# Patient Record
Sex: MALE | Race: WHITE
[De-identification: names, ages, dates, MRNs, and addresses within clinical notes are randomized per-mention and may not be internally consistent; named-entity substitution may affect disease eponyms.]

---

## 2018-04-12 ENCOUNTER — HOSPITAL ENCOUNTER (INPATIENT)
Dept: HOSPITAL 80 - FED | Age: 43
LOS: 4 days | Discharge: HOME | DRG: 881 | End: 2018-04-16
Attending: SPECIALIST | Admitting: PSYCHIATRY & NEUROLOGY
Payer: COMMERCIAL

## 2018-04-12 DIAGNOSIS — R45.851: ICD-10-CM

## 2018-04-12 DIAGNOSIS — F32.9: Primary | ICD-10-CM

## 2018-04-12 DIAGNOSIS — F41.0: ICD-10-CM

## 2018-04-12 DIAGNOSIS — R94.6: ICD-10-CM

## 2018-04-12 DIAGNOSIS — R51: ICD-10-CM

## 2018-04-12 DIAGNOSIS — F42.9: ICD-10-CM

## 2018-04-12 DIAGNOSIS — Z63.79: ICD-10-CM

## 2018-04-12 DIAGNOSIS — Z56.6: ICD-10-CM

## 2018-04-12 LAB — PLATELET # BLD: 188 10^3/UL (ref 150–400)

## 2018-04-12 PROCEDURE — G0480 DRUG TEST DEF 1-7 CLASSES: HCPCS

## 2018-04-12 SDOH — HEALTH STABILITY - MENTAL HEALTH: OTHER PHYSICAL AND MENTAL STRAIN RELATED TO WORK: Z56.6

## 2018-04-12 NOTE — EDPHY
HPI/HX/ROS/PE/MDM


Narrative: 





CHIEF COMPLAINT:  Depression, suicidal thoughts





HISTORY OF PRESENT ILLNESS:   The patient is a 43 y/o male arriving voluntarily 

with his wife from his PCP's office for evaluation of depression and suicidal 

thoughts accelerating over the last 2 weeks. He has minimal prior medical 

history, though does say, "I've had a headache since I was 18 and it's never 

gone away;" he had negative brain imaging at some point.  





He tells me for several months, "I've had this paranoia and guilt about the 

health of my kids" regarding belief they have dental fluorosis and says, "I 

somehow feel responsible for that even though 9 other people and professionals 

have told me that's not what it is." His PCP describes this as rumination and 

his wife reports this has escalated over the last 2 weeks into a "cycle" and at 

times seems like a panic attack and has become debilitating. He went to his PCP 

for evaluation 4/3/18 and was prescribed Lexapro, which he has been taking 

daily without noticeable improvement. He says he hasn't eaten much during this 

time and has lost 20lbs. He describes sleeping for most of the day and great 

difficulty getting out of bed. He says he has no interest in any activities and 

just lies on the floor. 





Today, "I snapped and started getting aggressive with myself. I hit my head on 

the wall for no real reason other than maybe a combination of guilt, worry, and 

frustration." His wife says this event seemed to be provoked by the patient 

being left alone at home while she took their child to an appointment. When at 

baseline, the patient prefers to be alone and this is very uncharacteristic for 

him. She says, "I'm nervous leaving him on his own." The patient endorses vague 

suicidal thoughts, but denies a plan nor attempts to carry anything out. No 

ingestions, hallucinations, or angry thoughts towards others. He has never felt 

like this previously. He notes his father is terminally ill and his job is 

stressful. No family history of bipolar disorder in 1st degree family members. 





No fever, chills, chest pain, shortness of breath, palpitations, vomiting, 

diarrhea, urinary complaints. 





REVIEW OF SYSTEMS:


Aside from elements discussed in the HPI, a comprehensive 10-point review of 

systems was reviewed and is negative.





PAST MEDICAL HISTORY:   Constant headache since age 18. Started Lexapro 10mg, 

0.5mg clonazepam on 4/3/18.





FAMILY HISTORY: Grandfather had severe panic attacks and anxiety in his 50s 

forcing him to retire early, then  6 years later of prostate cancer. 





SOCIAL HISTORY:  Wife at bedside. No cigarettes. Little alcohol use. No illicit 

drugs or marijuana. Employed as . PCP: [facility] [place] [facility]





VITAL SIGNS: Reviewed by me


GENERAL: Well-developed, well-nourished, resting comfortably in no respiratory 

distress. Pleasant cooperative.  


HEENT: Atraumatic. Eyes: No icterus, no injection. Mild right anisocoria, pt 

reports is baseline since eye injury as a child. Mouth: moist mucous membranes.

  No erythema or lesions. Neck: supple with no adenopathy.


LUNGS: Clear to auscultation bilaterally, no wheezes, rhonchi or rales.


CARDIAC: Regular rate and rhythm, no rubs, murmurs or gallops.


ABDOMEN: Soft, nontender, nondistended, bowel sounds normal.


BACK:  No CVA tenderness.


EXTREMITIES: No trauma. No edema.  Range of motion is normal throughout.


NEURO: Alert and oriented,  grossly nonfocal.  


SKIN: Warm and dry, no rash.


PSYCHIATRIC: Normal mentation, no agitation.





Portions of this note were transcribed by a medical scribe.  I personally 

performed a history, physical exam, medical decision making, and confirmed 

accuracy of information the transcribed note.


ED Course: 





This is a normally healthy 43 y/o male who presents with several months of 

paranoia, depressive symptoms, and suicidal ideation that have accelerated over 

the last 2 weeks. He has mild right anisocoria, but an otherwise unremarkable 

exam. Plan for standard psychiatric work up including IV, labs, UA, and mental 

health evaluation once medically cleared. 





Head CT ordered as patient's last brain imaging was over 20 years ago. 





Head CT: negative. 





Patient seen by [facility].  Recommendations at this point are to 

seek inpatient treatment.





11:00 p.m.:  We are awaiting placement at [address] [place] [address].


MDM: 





Differential diagnoses for the patient's symptom complex was considered 

including but not limited to depression with psychotic features, major 

depression, suicidal ideation, anger management, bipolar disorder, acute 

psychosis.





- Data Points


Imaging Results: 


 Imaging Impressions





Head CT  18 18:29


Impression:  Normal CT of the head. Specifically, negative for bleed or mass 

lesion. 


 


Results called and discussed with Susan Couch MD on 2018 at 19:17


 











Imaging: Discussed imaging studies w/ On call Radiologist, I viewed and 

interpreted images myself


Laboratory Results: 


 Laboratory Results





 18 17:25 





 18 17:25 





 











  18 18 18





  17:25 17:25 17:15


 


WBC    10.76 10^3/uL H 10^3/uL  





    (3.80-9.50)  


 


RBC    4.69 10^6/uL 10^6/uL  





    (4.40-6.38)  


 


Hgb    15.7 g/dL g/dL  





    (13.7-17.5)  


 


Hct    45.1 % %  





    (40.0-51.0)  


 


MCV    96.2 fL fL  





    (81.5-99.8)  


 


MCH    33.5 pg pg  





    (27.9-34.1)  


 


MCHC    34.8 g/dL g/dL  





    (32.4-36.7)  


 


RDW    12.8 % %  





    (11.5-15.2)  


 


Plt Count    188 10^3/uL 10^3/uL  





    (150-400)  


 


MPV    10.9 fL fL  





    (8.7-11.7)  


 


Neut % (Auto)    74.5 % H %  





    (39.3-74.2)  


 


Lymph % (Auto)    16.9 % %  





    (15.0-45.0)  


 


Mono % (Auto)    7.6 % %  





    (4.5-13.0)  


 


Eos % (Auto)    0.2 % L %  





    (0.6-7.6)  


 


Baso % (Auto)    0.3 % %  





    (0.3-1.7)  


 


Nucleat RBC Rel Count    0.0 % %  





    (0.0-0.2)  


 


Absolute Neuts (auto)    8.02 10^3/uL H 10^3/uL  





    (1.70-6.50)  


 


Absolute Lymphs (auto)    1.82 10^3/uL 10^3/uL  





    (1.00-3.00)  


 


Absolute Monos (auto)    0.82 10^3/uL H 10^3/uL  





    (0.30-0.80)  


 


Absolute Eos (auto)    0.02 10^3/uL L 10^3/uL  





    (0.03-0.40)  


 


Absolute Basos (auto)    0.03 10^3/uL 10^3/uL  





    (0.02-0.10)  


 


Absolute Nucleated RBC    0.00 10^3/uL 10^3/uL  





    (0-0.01)  


 


Immature Gran %    0.5 % %  





    (0.0-1.1)  


 


Immature Gran #    0.05 10^3/uL 10^3/uL  





    (0.00-0.10)  


 


Sodium  143 mEq/L mEq/L    





   (135-145)   


 


Potassium  4.2 mEq/L mEq/L    





   (3.5-5.2)   


 


Chloride  106 mEq/L mEq/L    





   ()   


 


Carbon Dioxide  26 mEq/l mEq/l    





   (22-31)   


 


Anion Gap  11 mEq/L mEq/L    





   (8-16)   


 


BUN  15 mg/dL mg/dL    





   (7-23)   


 


Creatinine  0.9 mg/dL mg/dL    





   (0.7-1.3)   


 


Estimated GFR  > 60     





    


 


Glucose  83 mg/dL mg/dL    





   ()   


 


Calcium  9.7 mg/dL mg/dL    





   (8.5-10.4)   


 


TSH  0.461 uIU/mL L uIU/mL    





   (0.465-4.680)   


 


Salicylates  < 1.0 mg/dL L mg/dL    





   (2.0-20.0)   


 


Urine Opiates Screen      NEGATIVE 





     (NEGATIVE) 


 


Acetaminophen  < 10 mcg/mL L mcg/mL    





   (10-30)   


 


Urine Barbiturates      NEGATIVE 





     (NEGATIVE) 


 


Ur Phencyclidine Scrn      NEGATIVE 





     (NEGATIVE) 


 


Ur Amphetamine Screen      NEGATIVE 





     (NEGATIVE) 


 


U Benzodiazepines Scrn      NEGATIVE 





     (NEGATIVE) 


 


Urine Cocaine Screen      NEGATIVE 





     (NEGATIVE) 


 


U Marijuana (THC) Screen      NEGATIVE 





     (NEGATIVE) 


 


Ethyl Alcohol  < 10 mg/dL mg/dL    





   (0-10)   














General


Time Seen by Provider: 18 16:30


Initial Vital Signs: 


 Initial Vital Signs











Temperature (C)  36.7 C   18 16:24


 


Heart Rate  75   18 16:24


 


Respiratory Rate  17   18 16:24


 


Blood Pressure  107/66   18 16:24


 


O2 Sat (%)  97   18 16:24








 











O2 Delivery Mode               Room Air














Allergies/Adverse Reactions: 


 





No Known Allergies Allergy (Verified 18 16:23)


 








Home Medications: 














 Medication  Instructions  Recorded


 


CLONAZEPAM  18


 


Escitalopram Oxalate  18














Departure





- Departure


Disposition: [facility] IP


Clinical Impression: 


 Severe major depression





Condition: Fair


Referrals: 


Deon Aquino DO [Primary Care Provider] - As per Instructions


Report Scribed for: Susan Couch


Report Scribed by: Yun Musa


Date of Report: 18


Time of Report: 17:39

## 2018-04-13 NOTE — BAPA
[f rep st]



                                                  ADMISSION PSYCHIATRIC ASSESSMENT





DATE OF SERVICE:  2018



REASON FOR ADMISSION:  The patient is a 42-year-old  male with a lifelong history of excessi
ve worrying and a complex of OCD symptoms who notices severe worsening of this over the last several 
weeks.  He states that this has happened to him before in times of particular stress where his anxiet
ies and worries will overwhelm him and then it typically resolves.  He states, however, this time is 
much more intense and has led him to be unable to work and actually engaged in some self-harming.  He
 reports several stresses including his father's terminal illness that has taken a turn for the worse
.  He states that he was notified 2 weeks ago and then he went out to visit him in [place] and feels v
harley powerless to help.  He is also somewhat conflicted and feels guilty about his lack of a close rel
ationship with his father.  He has become overly concerned about the health of his children recently 
as well.  He reports this dates back to his childhood when his younger brother was born when he was 1
0 years old.  He states that he immediately began "worrying excessively that he would be hurt or kill
ed and would be my fault."  He states that he watched over him and "hovered" for his entire childhood
 to make sure that he was safe.  He describes this as being in a "guilt, fear, guilt, fear" pattern i
n which he feels guilty about action or inaction, specifically, in reference to the possibility that 
others will be harmed or that he will not adequately protect them.  He states "I get really stuck on 
things and just can't let it go.  I believe no matter what, I am going to be blamed if something bad 
happens and I feel guilty."  He reports his current focus is on his children having possible fluorosi
s.  He states that this is some discoloration of the teeth caused by excessive fluoride use.  He repo
rts having noticed a white spot on his son's tooth and possibly a white streak on his daughter's toot
h, both of which he is convinced are from excessive fluoride.  He has contacted the [facility]
[employer] who tell him there is not fluoride in the [place] water supply, but he does not believe them.  He 
also worries that he overly encouraged them to use toothpaste at a young age and that this might be a
 result of that, and is his fault.  He admits to "being the maternal one," in the parenting of the guy garcia, stating that he is "always hovering over them taking care of them and worrying about them."  
For the last 2 weeks, this has been particularly intense and he feels paralyzed.  He has been unable 
to work and has been home on leave.  He spends much of his time lying on the floor in the fetal posit
ion and is having frequent if not daily panic attacks.  He saw his primary care physician 2 weeks ago
, who started him on Lexapro and Klonopin and he reports some benefit from the Klonopin for sleep, th
ough is less beneficial for the panic attacks.  He states that he feels mentally dull and sleepy on t
he Klonopin and does not like to take it.  Despite this, he has been taking it 0.25 mg at bedtime and
 0.25 mg around 4:00 a.m. when he wakes up every morning.  He states this is unusual for him to wake 
ups that early and he always feels very anxious and shaky.  He has also been taking the Lexapro on a 
daily basis at bedtime.  In addition to the excessive worries, he states that he has always engaged i
n a lot of ordering behavior.  He notes that on his desk at work all the papers are perfectly symmetr
ical squared and that if they were not he have to stop his work to fix it.  He is particular about th
e order in which he does things such as his daily routine or his work.  He has frequent catastrophic 
fears that others may be harmed or have been harmed through acts of omission or neglect on his part a
nd he worries about this a lot, especially in regard to his family.  He reports frequent repetitive t
houghts and fears that have led to intense frustration, especially recently leading him to "feel like
 I am going to explode."  He states that this leads to the anxiety attacks, but also led to him bangi
ng his head into the wall several times yesterday.  It was at this point that his wife took him to [facility] primary care physician who then referred him to the emergency department.  The patient states that 
his appetite has been decreased.  He felt sad and tearful for the last 2 weeks.  He states he has los
t 20 pounds.  He denies any thoughts of suicide, but states "I do not think I could go on living like
 this.  It's terrible."



PAST PSYCHIATRIC HISTORY:  Noncontributory for any previous psychiatric treatment of any kind.  He ha
s no history of psychiatric hospitalizations or suicide attempts.



ALLERGIES:  No known medical allergies.



CURRENT MEDICATIONS:  Lexapro 10 mg daily and Klonopin 0.25 to 0.5 mg b.i.d.



PAST MEDICAL HISTORY:  Noncontributory with a history of chronic headaches.  The patient received a C
T scan of his brain in the emergency department, which showed no abnormalities.  The patient states h
colin has had this worked up numerous times in the past and that he has had a constant headache since 
ldh.



SOCIAL HISTORY:  Patient is a  working as the  at an engineering firm
.  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  He states this is 
a very detailed job.  He also states that it is quite stressful recently as they are understaffed and
 he is facing the possibility of failing to meet a deadline for the first time in his career.  He rep
orts putting this pressure on himself and working excessively over the last month, adding to his stre
ss.  He is  for 14 years and has 2 children, age 7 and 9.  He states his marriage is happy and
 they have a good relationship and his kids are doing very well.  His parents and siblings have moved
 from [place] to [place], where they currently reside.  His father is reportedly dying at this time
.  The patient used to enjoy hiking and mountain biking, but has not done this for more than 6 months
 due to his work demands.  He denies any legal problems, financial problems or other stresses at this
 time.  He does state that he had a similar anxiety episode several months ago when they proposed put
ting a cell tower next to his home.  He states that he "just lost it" and could do nothing perseverat
e about the potentially negative aspects of this.  He states they eventually decided not to build it.
  He was relieved.



SUBSTANCE ABUSE HISTORY:  Patient states he drinks about 1 beer a month.  Has no other substance use.




FAMILY HISTORY:  The patient's maternal grandfather "internalized everything."  The patient states th
at he "thought everything was his fault."  He states that his he had to retire early from his job as 
a  because "he took every loan personally and could not let them go."  He states that whe
n a person would default on a loan, his grandfather would be extremely hurt by this and that eventual
ly caused him to have to retire early.  He  at 61 from prostate cancer.  The patient's mother has
 been diagnosed as addicted to gambling and had to go to recovery work.



ADMISSION LABORATORY:  CBC shows a white count up at 10.76 with neutrophil percentage up at 74.5%.  S
nae chemistries are normal.  TSH is low at 0.461.  Urine drug screen is negative for all substances.
  Alcohol is less than detectable.



MENTAL STATUS EXAMINATION:  Reveals a thin though healthy-appearing  male.  He is neatly and
 appropriately dressed.  He interacts well with examiner displaying good eye contact and overall plea
aníbal demeanor.  He appears somewhat anxious with a rather rigid and closed body posture and constrict
ed affect.  He describes his mood as "pretty depressed."  His thought process is linear and goal dire
cted.  His thought content reveals no evidence of psychosis.  He is alert and oriented to person, floresita
ce, time, and situation, and his sensorium is clear.  He denies any thoughts of suicide, homicide, or
 violence.  His intellect appears to be above average as evidenced by his educational and occupationa
l history, his fund of knowledge, and vocabulary.  His insight and judgment appear to be good.



IMPRESSION:  Obsessive-compulsive disorder, possible unspecified depressive disorder, father's illnes
s, work stress. 



The patient is a pleasant 42-year-old  male with a lifelong history of obsessive compulsive 
disorder symptoms that have worsened in times of social stress.  He states that he is unable to funct
ion at this time and notes severe impairments in every area of functioning.  He was placed on Lexapro
 and clonazepam by his primary care physician, which he has not had a chance to really see benefit fr
om at this point.  I discussed with him at length potential medication changes including switching fr
om Lexapro to Celexa as I believe it is a slightly better anxiolytic.  He does not care to do this, b
ut is willing to increased from 10 to 20.  We will also change the clonazepam to lorazepam as I belie
ve this is a better anxiolytic with less of the sedating and heavy cognitive effects.  The risks, ryne
efits, and alternatives of both these medicines are discussed at length, as is the timeframe for resp
onse.  I emphasized to him that for anxiety, especially obsessive-compulsive anxiety, the maximum ryne
efit for serotonin reuptake inhibitors is out to 12 weeks.  The patient is accepting of this and stat
es he is very relieved and hopeful that he has a corroboration of his treatment path.



PLAN:  

1.  Admit to the behavior health services inpatient unit on an M1 hold.

2.  Continue medications as above.

3.  Engage in individual, group, and milieu psychotherapies and then introduce into cognitive behavio
ral treatments.

4.  The patient states that he believes he needs to be in a safe place for a couple of days because lo shaw feels completely out of control and unsafe.  I believe this is reasonable.

5.  Estimated length of stay is 3-5 days.





Job #:  [id number]

## 2018-04-13 NOTE — BCON
[f 
rep st]



                                                  BEHAVIORAL HEALTH CONSULTATION





DATE OF CONSULTATION:  04/13/2018



REFERRING PHYSICIAN:  Machelle Camarena MD



REASON FOR REFERRAL:  Medical clearance for inpatient behavioral health stay.



HISTORY OF PRESENT ILLNESS:  This patient came to the emergency department 
voluntarily with his wife from his primary care provider's office for 
evaluation of depression and suicidal thoughts, which had worsened over 
approximately 2 weeks.  He was evaluated by the mental health team and admitted 
for further psychiatric care. 



He currently is without complaint.  He is feeling better.  He has had a much 
reduced appetite and weight loss, but reports his appetite is now normal.



PAST MEDICAL HISTORY:  

1.  Concussion as a child.

2.  Trauma to his right eye as a child.

3.  Chronic headaches since age 18.



PAST SURGICAL HISTORY:  He has not had any surgeries.



MEDICATIONS:  He had been prescribed escitalopram 10 mg daily and clonazepam 
0.5 mg twice daily p.r.n.



ALLERGIES:  There are no known drug allergies.



SOCIAL HISTORY:  He is .  He lives with his wife.  He works as a 
.  He is a nonsmoker and uses occasional alcohol use.



FAMILY HISTORY:  Significant for brain cancer in his father who will soon be on 
hospice, colon cancer in another relative, and melanoma in 2 other relatives 
who had metastatic disease to the brain and spinal cord.



REVIEW OF SYSTEMS:  He reports weight loss of approximately 20 pounds over 
several weeks.  He reports he has not been eating.  He otherwise denies 
symptoms referable to the thyroid including no tremors, no sweats, no 
hyperdefecation.  Otherwise, a 10-point review of systems is negative.



PHYSICAL EXAMINATION:  VITAL SIGNS:  Blood pressure is 116/66, heart rate is 88
, respiratory rate is 16, oxygen saturation is 93% on room air.  Temperature is 
36.7 degrees centigrade.  His weight is 81.6 kg for a body mass index of 23.1.  
GENERAL:  This is a well-nourished, well-developed man, dressed in street 
clothes, accompanied by his wife, cooperative and in no acute distress.  HEENT:
  Extraocular movements are intact.  He has very mild anisocoria with the left 
pupil slightly larger than the right.  Pupils are reactive to light.  Mucous 
membranes are moist.  Dentition is in good condition.  He has an uncrowded 
airway, Mallampati class 1.  NECK:  Supple with no thyromegaly and no thyroid 
nodules palpable.  HEART:  There is regular rate and rhythm with no murmurs, 
rubs, or gallops.  LUNGS:  Clear to auscultation bilaterally.  ABDOMEN:  
Benign. EXTREMITIES:  There is no cyanosis, clubbing, or edema.  NEUROLOGIC:  
He is alert and oriented x3.  Cranial nerves 2-12 are grossly intact.  There is 
no focal weakness.  Sensation is intact to light touch and gait is within 
normal limits.



LABORATORY STUDIES:  CBC revealed mild leukocytosis with a white blood cell 
count of 10.76.  There was no left shift.  Serum chemistry revealed normal 
renal function and electrolytes.  TSH was very slightly suppressed at 0.461 
with the lower limit of normal being 0.465.  Toxicology screen in the serum was 
negative for salicylates, acetaminophen or ethyl alcohol, and the urine was 
negative for any substances of abuse.



ASSESSMENT/RECOMMENDATIONS:  

1.  Mental health issues pending further evaluation and management per 
Psychiatry and the mental health team.

2.  Weight loss, likely due to anorexia resulting from his mental health 
condition.

3.  Suppressed TSH.  He is currently without any other symptoms consistent with 
hyperthyroidism.  It is possible that his suppressed TSH is related to stress 
due to mental health issues.  Advised repeating TSH in approximately 6 weeks.

4.  Chronic headache with a history of a concussion.  There was no need to any 
initiate any specific treatment of this while he is inpatient.  He can follow 
up with his primary care physician.

5.  History of concussion.  He had a normal head CT.  It seems unlikely that 
his symptoms would have anything to do with a history of a brain injury or with 
chronic traumatic encephalopathy. 



I see no medical contraindications to this patient's continued stay at the 
inpatient behavioral health unit or to any psychiatric medications or 
procedures. 



Thank you very much for including me in the care of this patient and please do 
not hesitate to contact me or the hospitalist service should there be need for 
further medical evaluation.





Job #:  [id number]

MTDD

## 2018-04-14 NOTE — SOAPPROG
SOAP Progress Note


Assessment/Plan: 


Assessment:








41 yo man with h/o OCD symptoms, who currently presents with worsening 

depression and thoughts of self-harm x 2 weeks. 

















Plan:





04/14/18 13:09


1. Slightly elevated WBC's. Slightly suppressed TSH (0.461). Dr. Aden 

recommends f/u with PCP in 6 weeks. 


2. Patient has agreed to increase in Lexapro to 20 mg and switch from 

Clonazepam to Lorazepam for anxiety/panic. 


3. Patient has been eating and sleeping better in hospital, but still c/o 

anxiety, thought less than prior to admit. 


Subjective: 


Met with patient, reviewed chart and d/w staff. Patient told CC this AM, "I 

haven't felt this good in months." He reports sleeping better in hospital and 

having more appetite and eating better. He slept 7 hours last night, which is 

significantly more than he was getting at home. He says he is still anxious and 

took PRN Ativan x 2 doses this AM. However, he reports anxiety is "much better" 

than he had prior to admit. He denies any SE's from increased dose of Lexapro. 

he denies any thoughts, plan or intent to hurt himself or anyone else. 





Objective: 





 Vital Signs











Temp Pulse Resp BP Pulse Ox


 


 36.3 C   87   20   106/65   96 


 


 04/14/18 06:00  04/14/18 06:00  04/14/18 06:00  04/14/18 06:00  04/14/18 06:00








MSE: Affect: Anxious  Mood: "Better" but "anxious"  TP: Linear, goal-directed  

TC: Denies any SI/HI, no hallucination or paranoia  Insight/Judgment: Fair





- Time Spent With Patient


Time Spent With Patient: 


15"








- Pending Discharge


Pending Discharge Within 24 Hours: No


Pending Discharge Within 48 Hours: No





ICD10 Worksheet


Patient Problems: 


 Problems











Problem Status Onset


 


Severe major depression Acute

## 2018-04-15 VITALS — SYSTOLIC BLOOD PRESSURE: 115 MMHG | DIASTOLIC BLOOD PRESSURE: 67 MMHG

## 2018-04-15 NOTE — SOAPPROG
SOAP Progress Note


Assessment/Plan: 


Assessment:








41 yo man with h/o OCD symptoms, who currently presents with worsening 

depression and thoughts of self-harm x 2 weeks. 

















Plan:





04/14/18 13:09


1. Slightly elevated WBC's. Slightly suppressed TSH (0.461). Dr. Aden 

recommends f/u with PCP in 6 weeks. 


2. Patient has agreed to increase in Lexapro to 20 mg and switch from 

Clonazepam to Lorazepam for anxiety/panic. 


3. Patient has been eating and sleeping better in hospital, but still c/o 

anxiety, thought less than prior to admit. 





04/15/18 13:39


1. Feels much less anxious, only "needs" Ativan at Noon and before bed. 


2. Slept 8-1/2 hrs, significantly "better" than at home. Denies any SI. 


3. Wants to d/c tomorrow. Needs f/u appointments. 


Subjective: 


Met with patient, reviewed chart and d/w staff. Patient was pacing the halls 

this AM, but says he is feeling "better" and has "much less" anxiety than prior 

to admit. He says he is eager to go home and says his wife is comfortable with 

him returning home "as soon as possible." He agrees to stay in hospital until 

CC can help arrange intake appointments with therapist and prescriber. He 

denies any SI/HI. 








Objective: 





 Vital Signs











Temp Pulse Resp BP Pulse Ox


 


 36.3 C   87   20   106/65   96 


 


 04/14/18 06:00  04/14/18 06:00  04/14/18 06:00  04/14/18 06:00  04/14/18 06:00








MSE: Affect: Euthymic  Mood: "Better"  TP: Linear  TC: Denies any SI/HI, no 

psychosis  Insight/Judgment: Fair





- Time Spent With Patient


Time Spent With Patient: 


20"








- Pending Discharge


Pending Discharge Within 24 Hours: Yes


Pending Discharge Within 48 Hours: No


Pending Discharge Date: 04/16/18 (D/C on Monday after f/u appts finalized)


Pending Discharge Time: 11:00





ICD10 Worksheet


Patient Problems: 


 Problems











Problem Status Onset


 


Severe major depression Acute

## 2018-04-16 NOTE — BDS
[f 
rep st]



                                                  BEHAVIORAL HEALTH DISCHARGE 
SUMMARY





REASON FOR ADMISSION:  Patient is a 42-year-old  man, with a lifelong 
history of excessive worrying and a complex of OCD symptoms, who noted severe 
worsening of his OCD and anxiety for the past several weeks.  He states that 
this has happened to him before in times of particular stress when his anxiety 
and worry will overwhelm him, and it typically resolves.  He says, however, 
this time it is much more intense and has led him to be unable to work and 
actually engaged in some self-harming.  He reports several stressors including 
his father's terminal illness that has taken a turn for the worse.  He was 
notified 2 weeks ago when he went to visit his father in [place], and he feels 
powerless to help his dad.  He is also somewhat conflicted and feels guilty 
about his lack of a close relationship with his father.  He has become overly 
concerned about the health of his children recently as well.  He says this 
dates back to when his younger brother was born when he was 9 years old.  He 
says that he immediately began to "worry excessively that he would be hurt or 
killed, and it would be my fault."  For the last 2 weeks, the patient says that 
he has been paralyzed by fear that his young children have had fluoride 
toxicity.  He has been unable to work and has been home on leave.  He spends 
much of his time lying on the floor and frequent, if not daily, panic attacks.  
He saw his primary care physician 2 weeks prior to admission, who started him 
on Lexapro and Klonopin, and he says he had some benefit from Klonopin for 
sleep but less beneficial for panic.  Patient says "I feel like I'm going to 
explode."  He has been sad and tearful for the last 2 weeks.  He has lost 20 
pounds.  He denies any thoughts of suicide but states "I do not think I could 
go on living like this.  It's terrible."



ADMITTING DIAGNOSES:  

1.  Obsessive compulsive disorder.

2.  Possible unspecified depressive disorder.

3.  Psychosocial stressors include father's terminal illness, work-related 
issues, and stress.  

Admitting physical examination was done by Dr. Azar Aden.  Please see his 
H and P for details.  Admission labs were done in the [facility] ED.  White cell 
count was 10.76, hemoglobin was 15.7, hematocrit 45.1, platelet count 188.  
Sodium was 143, potassium was 4.2, chloride 106, BUN was 15, creatinine 0.9, 
glucose 83, calcium 9.7.  TSH was 0.461.  Tox screen was negative for all drugs 
of abuse.  Salicylate and acetaminophen levels were both undetected.  Ethyl 
alcohol level was less than 10.



HOSPITAL COURSE:  At time of admission, patient had a lengthy conversation with 
Dr. Isauro Lion about the most effective ways to treat his OCD symptoms 
as well as his anxiety and panic.  Dr. Lion recommended increasing the 
patient's SSRI from 10 mg to 20 mg.  patient gave informed consent for this 
increase to happen.  Patient understands that it takes several weeks for the 
medication become fully effective and that it is not something that he is 
likely to see immediate benefits from.  Dr. Lion also discussed with the 
patient various uses in the short term for addressing his anxiety and panic 
with benzodiazepines.  Dr. Lion recommended changing the patient from 
clonazepam to lorazepam.  Patient gave informed consent for both of these 
medication changes which were started on 04/13/2018.  When this MD met with the 
patient on 04/14/2018, patient stated that, "I haven't felt this good in months.
"  He says that he is sleeping better in the hospital and had more of an 
appetite and was eating which he had not been doing at home.  He slept 7 hours 
on Friday night which is significantly more than he was getting at home.  He 
says he is still anxious, and he took 2 doses of p.r.n. Ativan.  However, he 
reports that his anxiety is "much better" than it was prior to his admission.  
He denied any side effects from the increased dose of Lexapro.  He denies any 
thoughts, plans, or intent to hurt himself or anyone else.  



Dr. Aden noted in his H and P that the patient had slightly elevated white 
cell count and a slightly suppressed TSH, which was 0.461, only slightly below 
normal.  Dr. Aden recommended the patient follow up with his PCP in 6 weeks 
for repeat thyroid panel.  On 04/15/2018, the patient continued to endorse 
improvement in his mood.  He denied feeling depressed, sad.  Denied any thoughts
, plans, or intent to hurt himself.  He was more future oriented and 
optimistic.  He was interested in getting connected with mental health 
providers on an outpatient basis and doing some therapy, such as cognitive 
behavioral therapy, in order to address his stress and help him manage his 
anxiety better.  He says that he is only needing to use the Ativan at noon and 
before bedtime, and he slept 8.5 hours on Saturday night which is significantly 
better than he was sleeping prior to his admission.  He also notes improved 
appetite and is eating more and is looking forward to going back home.  



Patient did sign himself in voluntarily and, on Monday morning, he was looking 
forward to getting set up with outpatient counseling and a therapist, as well 
as a medication prescriber to continue to monitor him for his medications.  
Patient denied any thoughts, plans, or intent to hurt himself or anyone else.  
He reported improvement in his anxiety and more stability in his mood.  He says 
that he was not feeling as disabled by intrusive thoughts or by his obsessive 
thoughts or compulsive worrying.  He said he still did worry about fluoride 
toxicity for his kids but that was not paralyzing him or leading him to be 
unable to function.  MD spoke with the Care Coordinator, João, who is helping 
to arrange the patient's aftercare.  João left messages with the care 
coordinator in the [facility], Jed, and the plan was for the 
patient to have an intake appointment at the [facility], see a 
prescriber and a therapist in the clinic, and get more information about their 
IOP program which the patient said he was interested in considering after 
discharge.



CONDITION AT DISCHARGE:  Patient was stable.  His affect was euthymic.  He 
reported significantly less anxiety.  No depression.  No sadness.  He was not 
endorsing any thoughts, plans, or intent to hurt himself or anyone else.  He 
was compliant with his medications and looking forward to his outpatient 
treatment.



DISCHARGE MEDICATIONS:  Include Lexapro 20 mg p.o. daily, 30 tablets, no refills
, and lorazepam 0.5 mg p.o. q.6 hours p.r.n. for anxiety, 30 tablets, no 
refills.



DISCHARGE DIAGNOSES:  

1.  Depression, not otherwise specified.

2.  Obsessive compulsive disorder.

3.  Rule out generalized anxiety disorder.

4.  Panic disorder without agoraphobia.

5.  Psychosocial stressors include father's terminal illness, work stress, and 
concerns about his children's physical well-being.



DISPOSITION:  Patient was discharged from the hospital in the care of his wife 
to return back home to his family.  He has a followup appointment in the [facility] to see Dr. Mary Albert for medication management and to be 
assigned individual therapist, as well as to be provided information about 
their intensive outpatient group therapy program.



LEGAL COURSE:  Patient was changed to voluntary status at the expiration of his 
M1 hold prior to his discharge.





Job #:  [id number]

MTDD